# Patient Record
Sex: FEMALE | Race: WHITE | Employment: OTHER | URBAN - METROPOLITAN AREA
[De-identification: names, ages, dates, MRNs, and addresses within clinical notes are randomized per-mention and may not be internally consistent; named-entity substitution may affect disease eponyms.]

---

## 2018-02-10 PROBLEM — S73.034A ANTERIOR DISLOCATION OF RIGHT HIP (HCC): Status: ACTIVE | Noted: 2018-02-10

## 2023-10-30 ENCOUNTER — HOSPITAL ENCOUNTER (EMERGENCY)
Age: 68
Discharge: HOME | End: 2023-10-30
Attending: EMERGENCY MEDICINE
Payer: MEDICARE

## 2023-10-30 ENCOUNTER — APPOINTMENT (OUTPATIENT)
Dept: GENERAL RADIOLOGY | Age: 68
End: 2023-10-30
Payer: MEDICARE

## 2023-10-30 ENCOUNTER — APPOINTMENT (OUTPATIENT)
Dept: CT IMAGING | Age: 68
End: 2023-10-30
Payer: MEDICARE

## 2023-10-30 VITALS
BODY MASS INDEX: 23.64 KG/M2 | HEIGHT: 69 IN | TEMPERATURE: 98.2 F | HEART RATE: 94 BPM | DIASTOLIC BLOOD PRESSURE: 130 MMHG | WEIGHT: 159.61 LBS | SYSTOLIC BLOOD PRESSURE: 175 MMHG | OXYGEN SATURATION: 100 % | RESPIRATION RATE: 24 BRPM

## 2023-10-30 DIAGNOSIS — M25.462 EFFUSION OF LEFT KNEE: ICD-10-CM

## 2023-10-30 DIAGNOSIS — S80.02XA CONTUSION OF LEFT KNEE, INITIAL ENCOUNTER: Primary | ICD-10-CM

## 2023-10-30 PROCEDURE — 73564 X-RAY EXAM KNEE 4 OR MORE: CPT

## 2023-10-30 PROCEDURE — 6370000000 HC RX 637 (ALT 250 FOR IP): Performed by: EMERGENCY MEDICINE

## 2023-10-30 PROCEDURE — 99284 EMERGENCY DEPT VISIT MOD MDM: CPT

## 2023-10-30 PROCEDURE — 73562 X-RAY EXAM OF KNEE 3: CPT

## 2023-10-30 PROCEDURE — 96372 THER/PROPH/DIAG INJ SC/IM: CPT

## 2023-10-30 PROCEDURE — 6360000002 HC RX W HCPCS: Performed by: EMERGENCY MEDICINE

## 2023-10-30 PROCEDURE — 73700 CT LOWER EXTREMITY W/O DYE: CPT

## 2023-10-30 RX ORDER — KETOROLAC TROMETHAMINE 30 MG/ML
15 INJECTION, SOLUTION INTRAMUSCULAR; INTRAVENOUS ONCE
Status: DISCONTINUED | OUTPATIENT
Start: 2023-10-30 | End: 2023-10-30

## 2023-10-30 RX ORDER — KETOROLAC TROMETHAMINE 30 MG/ML
15 INJECTION, SOLUTION INTRAMUSCULAR; INTRAVENOUS ONCE
Status: COMPLETED | OUTPATIENT
Start: 2023-10-30 | End: 2023-10-30

## 2023-10-30 RX ORDER — IBUPROFEN 800 MG/1
800 TABLET ORAL EVERY 8 HOURS PRN
Qty: 30 TABLET | Refills: 0 | Status: SHIPPED | OUTPATIENT
Start: 2023-10-30

## 2023-10-30 RX ORDER — OXYCODONE HYDROCHLORIDE AND ACETAMINOPHEN 5; 325 MG/1; MG/1
1 TABLET ORAL ONCE
Status: COMPLETED | OUTPATIENT
Start: 2023-10-30 | End: 2023-10-30

## 2023-10-30 RX ADMIN — KETOROLAC TROMETHAMINE 15 MG: 30 INJECTION, SOLUTION INTRAMUSCULAR; INTRAVENOUS at 08:53

## 2023-10-30 RX ADMIN — OXYCODONE AND ACETAMINOPHEN 1 TABLET: 5; 325 TABLET ORAL at 11:58

## 2023-10-30 ASSESSMENT — PAIN DESCRIPTION - ORIENTATION: ORIENTATION: LEFT

## 2023-10-30 ASSESSMENT — PAIN - FUNCTIONAL ASSESSMENT: PAIN_FUNCTIONAL_ASSESSMENT: 0-10

## 2023-10-30 ASSESSMENT — PAIN DESCRIPTION - ONSET: ONSET: SUDDEN

## 2023-10-30 ASSESSMENT — PAIN SCALES - GENERAL
PAINLEVEL_OUTOF10: 3
PAINLEVEL_OUTOF10: 3
PAINLEVEL_OUTOF10: 8

## 2023-10-30 ASSESSMENT — PAIN DESCRIPTION - DESCRIPTORS: DESCRIPTORS: DISCOMFORT

## 2023-10-30 ASSESSMENT — PAIN DESCRIPTION - LOCATION: LOCATION: KNEE

## 2023-10-30 ASSESSMENT — PAIN DESCRIPTION - PAIN TYPE: TYPE: ACUTE PAIN

## 2023-10-30 ASSESSMENT — PAIN DESCRIPTION - FREQUENCY: FREQUENCY: CONTINUOUS

## 2023-10-30 NOTE — DISCHARGE INSTRUCTIONS
You should wear the Ace wrap at all times to help with swelling. Wear the knee immobilizer and do not bear weight on that extremity until you are cleared to do so by orthopedic surgery. Call their office to schedule an outpatient appointment. Use crutches to help you ambulate. You may take prescription strength Motrin which has been prescribed to you to help with pain. Keep the limb elevated is much as possible. You may also take over-the-counter Tylenol to help with any breakthrough pain not treated with the Motrin.

## 2023-11-06 ENCOUNTER — OFFICE VISIT (OUTPATIENT)
Dept: ORTHOPEDIC SURGERY | Age: 68
End: 2023-11-06

## 2023-11-06 VITALS — HEIGHT: 70 IN | BODY MASS INDEX: 21.62 KG/M2 | WEIGHT: 151 LBS

## 2023-11-06 DIAGNOSIS — F40.240 CLAUSTROPHOBIA: Primary | ICD-10-CM

## 2023-11-06 DIAGNOSIS — S83.412A COMPLETE TEAR OF MEDIAL COLLATERAL LIGAMENT OF LEFT KNEE, INITIAL ENCOUNTER: ICD-10-CM

## 2023-11-06 RX ORDER — DIAZEPAM 5 MG/1
5 TABLET ORAL EVERY 8 HOURS PRN
Qty: 2 TABLET | Refills: 0 | Status: SHIPPED | OUTPATIENT
Start: 2023-11-06 | End: 2023-11-16

## 2023-11-10 ENCOUNTER — TELEPHONE (OUTPATIENT)
Dept: ORTHOPEDIC SURGERY | Age: 68
End: 2023-11-10

## 2023-11-10 NOTE — TELEPHONE ENCOUNTER
General Question     Subject: MRI RESULTS   Patient and /or Facility Request: Amos Mahan  Contact Number: [phone]     PATIENT CALLING REQUESTING A CALL BACK FROM SOMEONE IN DR KAUFFMAN'S OFFICE REGARDING HER MRI RESULTS DUE TO IT BEING HARD TO GET AROUND DUE TO THE PAIN IN HER LEFT KNEE       PLEASE CALL THE PATIENT BACK AT THE ABOVE NUMBER

## 2023-11-10 NOTE — TELEPHONE ENCOUNTER
Dr. Amy Mcdonald returned patient's call and reviewed MRI findings. CONCLUSION:   1. Acute nondisplaced fracture anteromedial femoral condyle. Marked osseous edema and   swelling. Cartilage flap inferomedial trochlea coalesces with the fracture. Complex layering   lipohemarthrosis. 2. Patellofemoral arthropathy. Cartilage ulcers lateral patella and trochlea. Subtle   subcortical marrow reaction. No acute OCD lesion. 3. Frayed lateral meniscus mid body free edge. No unstable tear. 4. Grade 1 sprain MCL. She was instructed to continue nonweightbearing status using crutches and brace. We will see her back in the office the first week of December with new x-rays.

## 2025-03-03 NOTE — PROGRESS NOTES
Date:  2023    Name:  Sanjay Martins  Address:  [address] [place] [address] [address]    :  1955      Age:   80 y.o.    SSN:  [id number]      Medical Record Number:  [id number]    Reason for Visit:    Chief Complaint    Knee Pain (New patient left knee )      DOS:2023     HPI: Nalini Isaac is a 80 y.o. female here today for evaluation left knee injury. Patient states that a week ago she suffered a fall onto her knee. She was seen in the emergency department, x-ray and CT scan were obtained which were negative for occult fracture however there was evidence of lipohemarthrosis indicating possible bony contusion. She was placed in a knee immobilizer and extension and has been nonweightbearing ever since. She feels slightly better however she is still very tender on the medial aspect of her knee. Pain Assessment  Location of Pain: Knee  Location Modifiers: Left  Severity of Pain: 6  Quality of Pain: Sharp, Aching  Duration of Pain: Persistent  Frequency of Pain: Constant  Aggravating Factors: Walking, Stairs, Standing (turning)  Limiting Behavior: Yes  Relieving Factors: Rest  Result of Injury: Yes  Work-Related Injury: No  Are there other pain locations you wish to document?: No  ROS: Review of systems reviewed from Patient History Form completed today and available in the patient's chart under the Media tab. Past Medical History:   Diagnosis Date    Hypertension         Past Surgical History:   Procedure Laterality Date    FOOT SURGERY      HIP CLOSED REDUCTION Right 02/10/2018       No family history on file.     Social History     Socioeconomic History    Marital status:    Tobacco Use    Smoking status: Never   Vaping Use    Vaping Use: Never used   Substance and Sexual Activity    Alcohol use: Yes     Comment: daily    Drug use: Never       Current Outpatient Medications   Medication Sig Dispense Refill    diazePAM (VALIUM) 5 MG tablet Take 1 tablet by mouth every 8 hours
No (0)